# Patient Record
Sex: FEMALE | Race: BLACK OR AFRICAN AMERICAN | Employment: FULL TIME | URBAN - METROPOLITAN AREA
[De-identification: names, ages, dates, MRNs, and addresses within clinical notes are randomized per-mention and may not be internally consistent; named-entity substitution may affect disease eponyms.]

---

## 2019-03-29 ENCOUNTER — HOSPITAL ENCOUNTER (OUTPATIENT)
Age: 24
Discharge: HOME | End: 2019-03-29
Attending: FAMILY MEDICINE
Payer: COMMERCIAL

## 2019-03-29 VITALS
BODY MASS INDEX: 28.35 KG/M2 | OXYGEN SATURATION: 100 % | TEMPERATURE: 98 F | DIASTOLIC BLOOD PRESSURE: 73 MMHG | RESPIRATION RATE: 20 BRPM | SYSTOLIC BLOOD PRESSURE: 137 MMHG | HEIGHT: 63 IN | HEART RATE: 85 BPM | WEIGHT: 160 LBS

## 2019-03-29 DIAGNOSIS — N76.0 ACUTE VAGINITIS: Primary | ICD-10-CM

## 2019-03-29 DIAGNOSIS — N34.2 URETHRITIS: ICD-10-CM

## 2019-03-29 PROCEDURE — 81025 URINE PREGNANCY TEST: CPT

## 2019-03-29 PROCEDURE — 87086 URINE CULTURE/COLONY COUNT: CPT | Performed by: FAMILY MEDICINE

## 2019-03-29 PROCEDURE — 99204 OFFICE O/P NEW MOD 45 MIN: CPT

## 2019-03-29 PROCEDURE — 87591 N.GONORRHOEAE DNA AMP PROB: CPT | Performed by: FAMILY MEDICINE

## 2019-03-29 PROCEDURE — 87205 SMEAR GRAM STAIN: CPT | Performed by: FAMILY MEDICINE

## 2019-03-29 PROCEDURE — 87808 TRICHOMONAS ASSAY W/OPTIC: CPT | Performed by: FAMILY MEDICINE

## 2019-03-29 PROCEDURE — 87106 FUNGI IDENTIFICATION YEAST: CPT | Performed by: FAMILY MEDICINE

## 2019-03-29 PROCEDURE — 87491 CHLMYD TRACH DNA AMP PROBE: CPT | Performed by: FAMILY MEDICINE

## 2019-03-29 PROCEDURE — 81003 URINALYSIS AUTO W/O SCOPE: CPT

## 2019-03-29 RX ORDER — FLUCONAZOLE 150 MG/1
150 TABLET ORAL ONCE
Qty: 1 TABLET | Refills: 0 | Status: SHIPPED | OUTPATIENT
Start: 2019-03-29 | End: 2019-03-29

## 2019-03-30 NOTE — ED INITIAL ASSESSMENT (HPI)
Patient states having vaginal discharge since yesterday, vaginal itchiness. Patient states she had unprotected sex with one partner on Wednesday. Patient states having some pain on urination, increased frequency and urgency.   Patient denies abdominal saul

## 2019-03-30 NOTE — ED PROVIDER NOTES
Pt seen in [facility]      Stated Complaint: Patient presents with:  Eval-G (genital)  Urinary Symptoms (urologic)      --------------   RN assessment:     Vag d/c, unprotected sex few d ago  --------------    CC: vaginal organization: Not on file        Attends meetings of clubs or organizations: Not on file        Relationship status: Not on file      Intimate partner violence:        Fear of current or ex partner: Not on file        Emotionally abused: Not on file curvature, ROM normal, no CVA tenderness   Abdomen:     Soft, non-tender, bowel sounds active all four quadrants,     no masses, no organomegaly   Extremities:   Extremities normal, atraumatic, no cyanosis or edema   Pulses:   2+ and symmetric all extremit

## 2019-04-09 ENCOUNTER — LAB ENCOUNTER (OUTPATIENT)
Dept: LAB | Facility: REFERENCE LAB | Age: 24
End: 2019-04-09
Attending: FAMILY MEDICINE
Payer: COMMERCIAL

## 2019-04-09 ENCOUNTER — OFFICE VISIT (OUTPATIENT)
Dept: FAMILY MEDICINE CLINIC | Facility: CLINIC | Age: 24
End: 2019-04-09
Payer: COMMERCIAL

## 2019-04-09 VITALS
BODY MASS INDEX: 28.35 KG/M2 | WEIGHT: 160 LBS | DIASTOLIC BLOOD PRESSURE: 60 MMHG | SYSTOLIC BLOOD PRESSURE: 110 MMHG | HEIGHT: 63 IN | TEMPERATURE: 99 F | HEART RATE: 76 BPM

## 2019-04-09 DIAGNOSIS — Z00.00 ROUTINE GENERAL MEDICAL EXAMINATION AT A HEALTH CARE FACILITY: ICD-10-CM

## 2019-04-09 DIAGNOSIS — Z83.49 FAMILY HISTORY OF THYROID DISEASE: ICD-10-CM

## 2019-04-09 DIAGNOSIS — Z83.3 FAMILY HISTORY OF DIABETES MELLITUS (DM): ICD-10-CM

## 2019-04-09 DIAGNOSIS — Z30.019 ENCOUNTER FOR INITIAL PRESCRIPTION OF CONTRACEPTIVES, UNSPECIFIED CONTRACEPTIVE: ICD-10-CM

## 2019-04-09 DIAGNOSIS — Z12.4 ENCOUNTER FOR PAPANICOLAOU SMEAR FOR CERVICAL CANCER SCREENING: Primary | ICD-10-CM

## 2019-04-09 PROCEDURE — 88175 CYTOPATH C/V AUTO FLUID REDO: CPT | Performed by: FAMILY MEDICINE

## 2019-04-09 PROCEDURE — 36415 COLL VENOUS BLD VENIPUNCTURE: CPT | Performed by: FAMILY MEDICINE

## 2019-04-09 PROCEDURE — 99385 PREV VISIT NEW AGE 18-39: CPT | Performed by: FAMILY MEDICINE

## 2019-04-09 PROCEDURE — 80050 GENERAL HEALTH PANEL: CPT | Performed by: FAMILY MEDICINE

## 2019-04-09 NOTE — PROGRESS NOTES
HPI: 22year old female presents for WWE (NP). VSS. Denies fevers/chills, C.P., palpitations, dizziness, SOB, cough, abd pain, N/V/D, fatigue. No recent illness. Nml urine without dysuria or hematuria. Nml BM's. Weight is stable.  LMP 04/01/19, regular mens normal.  Neck: Supple, symmetrical, trachea midline, nml thyroid: no enlargement/tenderness/nodules. Lungs: Clear to auscultation bilaterally. Chest wall: No tenderness or deformity.   Breasts: No breast masses, tenderness, asymmetry, nipple discharge or

## 2019-05-03 RX ORDER — ACETAMINOPHEN AND CODEINE PHOSPHATE 120; 12 MG/5ML; MG/5ML
0.35 SOLUTION ORAL DAILY
Qty: 84 TABLET | Refills: 0 | Status: SHIPPED | OUTPATIENT
Start: 2019-05-03 | End: 2019-05-31

## 2019-05-03 RX ORDER — NORETHINDRONE 0.35 MG/1
TABLET ORAL
Qty: 84 TABLET | Refills: 0 | OUTPATIENT
Start: 2019-05-03

## 2019-05-03 NOTE — TELEPHONE ENCOUNTER
A refill request was received for:  Requested Prescriptions     Pending Prescriptions Disp Refills   • Norethindrone (JOLIVETTE) 0.35 MG Oral Tab 84 tablet 3     Sig: Take 1 tablet (0.35 mg total) by mouth daily for 28 days.      Refused Pre

## 2019-09-25 ENCOUNTER — HOSPITAL ENCOUNTER (OUTPATIENT)
Age: 24
Discharge: HOME | End: 2019-09-25
Attending: EMERGENCY MEDICINE
Payer: COMMERCIAL

## 2019-09-25 VITALS
HEIGHT: 63 IN | RESPIRATION RATE: 18 BRPM | TEMPERATURE: 99 F | DIASTOLIC BLOOD PRESSURE: 69 MMHG | HEART RATE: 89 BPM | OXYGEN SATURATION: 100 % | SYSTOLIC BLOOD PRESSURE: 129 MMHG | WEIGHT: 164 LBS | BODY MASS INDEX: 29.06 KG/M2

## 2019-09-25 DIAGNOSIS — N89.8 VAGINAL DISCHARGE: ICD-10-CM

## 2019-09-25 DIAGNOSIS — N30.00 ACUTE CYSTITIS WITHOUT HEMATURIA: Primary | ICD-10-CM

## 2019-09-25 LAB
B-HCG UR QL: NEGATIVE
BILIRUB UR QL STRIP: NEGATIVE
COLOR UR: YELLOW
GLUCOSE UR STRIP-MCNC: NEGATIVE MG/DL
KETONES UR STRIP-MCNC: NEGATIVE MG/DL
NITRITE UR QL STRIP: NEGATIVE
PH UR STRIP: 7.5 [PH]
SP GR UR STRIP: 1.02
UROBILINOGEN UR STRIP-ACNC: <2 MG/DL

## 2019-09-25 PROCEDURE — 81025 URINE PREGNANCY TEST: CPT

## 2019-09-25 PROCEDURE — 99214 OFFICE O/P EST MOD 30 MIN: CPT

## 2019-09-25 PROCEDURE — 87106 FUNGI IDENTIFICATION YEAST: CPT | Performed by: EMERGENCY MEDICINE

## 2019-09-25 PROCEDURE — 87086 URINE CULTURE/COLONY COUNT: CPT | Performed by: EMERGENCY MEDICINE

## 2019-09-25 PROCEDURE — 81002 URINALYSIS NONAUTO W/O SCOPE: CPT

## 2019-09-25 PROCEDURE — 87205 SMEAR GRAM STAIN: CPT | Performed by: EMERGENCY MEDICINE

## 2019-09-25 PROCEDURE — 87591 N.GONORRHOEAE DNA AMP PROB: CPT | Performed by: EMERGENCY MEDICINE

## 2019-09-25 PROCEDURE — 87491 CHLMYD TRACH DNA AMP PROBE: CPT | Performed by: EMERGENCY MEDICINE

## 2019-09-25 PROCEDURE — 87808 TRICHOMONAS ASSAY W/OPTIC: CPT | Performed by: EMERGENCY MEDICINE

## 2019-09-25 RX ORDER — FLUCONAZOLE 150 MG/1
150 TABLET ORAL
Qty: 3 TABLET | Refills: 0 | Status: SHIPPED | OUTPATIENT
Start: 2019-09-25 | End: 2020-02-26 | Stop reason: ALTCHOICE

## 2019-09-25 RX ORDER — CEPHALEXIN 500 MG/1
500 CAPSULE ORAL 3 TIMES DAILY
Qty: 15 CAPSULE | Refills: 0 | Status: SHIPPED | OUTPATIENT
Start: 2019-09-25 | End: 2020-02-26 | Stop reason: ALTCHOICE

## 2019-09-25 NOTE — ED NOTES
Patient states she is here for a pelvic exam to be sure she does not have any STDs. She denies any real symptoms except for some itching.

## 2019-09-25 NOTE — ED PROVIDER NOTES
Patient Seen in: [address]      History   Patient presents with:  Eval-G (genital)    Stated Complaint: urinary problem    HPI    The patient is a 25-year-old female with no significant past medical history presents now nondistended  Pelvic:  Neurologic: Patient is awake, alert and oriented ×3.   The patient's motor strength is 5 out of 5 and symmetric in the upper and lower extremities bilaterally  Extremities: No focal swelling or tenderness  Skin: No pallor, no redness

## 2019-09-26 LAB
C TRACH DNA SPEC QL NAA+PROBE: NEGATIVE
N GONORRHOEA DNA SPEC QL NAA+PROBE: NEGATIVE

## 2019-09-27 LAB
GENITAL VAGINOSIS SCREEN: NEGATIVE
TRICHOMONAS SCREEN: NEGATIVE

## 2020-02-26 ENCOUNTER — OFFICE VISIT (OUTPATIENT)
Dept: FAMILY MEDICINE CLINIC | Facility: CLINIC | Age: 25
End: 2020-02-26
Payer: COMMERCIAL

## 2020-02-26 VITALS
OXYGEN SATURATION: 98 % | WEIGHT: 167 LBS | HEIGHT: 63.5 IN | RESPIRATION RATE: 18 BRPM | BODY MASS INDEX: 29.22 KG/M2 | DIASTOLIC BLOOD PRESSURE: 80 MMHG | SYSTOLIC BLOOD PRESSURE: 112 MMHG | HEART RATE: 92 BPM

## 2020-02-26 DIAGNOSIS — Z00.00 ROUTINE MEDICAL EXAM: ICD-10-CM

## 2020-02-26 DIAGNOSIS — R21 RASH AND NONSPECIFIC SKIN ERUPTION: ICD-10-CM

## 2020-02-26 DIAGNOSIS — Z11.3 SCREEN FOR STD (SEXUALLY TRANSMITTED DISEASE): Primary | ICD-10-CM

## 2020-02-26 PROCEDURE — 87491 CHLMYD TRACH DNA AMP PROBE: CPT | Performed by: FAMILY MEDICINE

## 2020-02-26 PROCEDURE — 99214 OFFICE O/P EST MOD 30 MIN: CPT | Performed by: FAMILY MEDICINE

## 2020-02-26 PROCEDURE — 90715 TDAP VACCINE 7 YRS/> IM: CPT | Performed by: FAMILY MEDICINE

## 2020-02-26 PROCEDURE — 87591 N.GONORRHOEAE DNA AMP PROB: CPT | Performed by: FAMILY MEDICINE

## 2020-02-26 PROCEDURE — 90471 IMMUNIZATION ADMIN: CPT | Performed by: FAMILY MEDICINE

## 2020-02-26 NOTE — PROGRESS NOTES
HPI:   Patient presents with:  STD: screening  Derm Problem      Traverse Nurse is a 22year old female.     She primarily presents for:  STD screening, due to recent relationship and risk of possible exposure  Patient without symptoms or complaints, no va oral lesions  HEART:  No chest pain or palpitations  LUNG:  No SOB, cough or wheeze  GI:  No abdominal pain.   No GI/rectal bleeding, No N/V/D/C  :  No dysuria/hematuria  MS:  No joint pain or swelling B  NEURO: No numbness, tingling or weakness  PSYCH: in good health, check labs as below, Tdap given today, follow-up pending results and for physical when due April 2020  - TETANUS, DIPHTHERIA TOXOIDS AND ACELLULAR PERTUSIS VACCINE (TDAP), >7 YEARS, IM USE  - CBC WITH DIFFERENTIAL WITH PLATELET;  Future  - C

## 2020-02-27 LAB
C TRACH DNA SPEC QL NAA+PROBE: NEGATIVE
N GONORRHOEA DNA SPEC QL NAA+PROBE: NEGATIVE

## 2020-10-28 ENCOUNTER — HOSPITAL ENCOUNTER (OUTPATIENT)
Age: 25
Discharge: HOME | End: 2020-10-28
Payer: COMMERCIAL

## 2020-10-28 VITALS
SYSTOLIC BLOOD PRESSURE: 129 MMHG | TEMPERATURE: 98 F | RESPIRATION RATE: 16 BRPM | DIASTOLIC BLOOD PRESSURE: 80 MMHG | OXYGEN SATURATION: 100 % | BODY MASS INDEX: 29 KG/M2 | WEIGHT: 165 LBS | HEART RATE: 83 BPM

## 2020-10-28 DIAGNOSIS — J02.9 VIRAL PHARYNGITIS: Primary | ICD-10-CM

## 2020-10-28 DIAGNOSIS — B34.9 VIRAL ILLNESS: ICD-10-CM

## 2020-10-28 DIAGNOSIS — M54.31 SCIATICA OF RIGHT SIDE: ICD-10-CM

## 2020-10-28 DIAGNOSIS — M25.559 HIP PAIN: ICD-10-CM

## 2020-10-28 PROCEDURE — 81002 URINALYSIS NONAUTO W/O SCOPE: CPT | Performed by: NURSE PRACTITIONER

## 2020-10-28 PROCEDURE — 81025 URINE PREGNANCY TEST: CPT | Performed by: NURSE PRACTITIONER

## 2020-10-28 PROCEDURE — 87880 STREP A ASSAY W/OPTIC: CPT | Performed by: NURSE PRACTITIONER

## 2020-10-28 PROCEDURE — 99203 OFFICE O/P NEW LOW 30 MIN: CPT | Performed by: NURSE PRACTITIONER

## 2020-10-28 RX ORDER — DEXAMETHASONE SODIUM PHOSPHATE 10 MG/ML
10 INJECTION, SOLUTION INTRAMUSCULAR; INTRAVENOUS ONCE
Status: COMPLETED | OUTPATIENT
Start: 2020-10-28 | End: 2020-10-28

## 2020-10-28 RX ORDER — PREDNISONE 20 MG/1
40 TABLET ORAL DAILY
Qty: 8 TABLET | Refills: 0 | Status: SHIPPED | OUTPATIENT
Start: 2020-10-31 | End: 2020-11-04

## 2020-10-29 ENCOUNTER — TELEPHONE (OUTPATIENT)
Dept: PEDIATRICS UNIT | Facility: HOSPITAL | Age: 25
End: 2020-10-29

## 2020-10-29 ENCOUNTER — LAB ENCOUNTER (OUTPATIENT)
Dept: LAB | Age: 25
End: 2020-10-29
Attending: PREVENTIVE MEDICINE

## 2020-10-29 ENCOUNTER — TELEPHONE (OUTPATIENT)
Dept: INTERNAL MEDICINE CLINIC | Facility: HOSPITAL | Age: 25
End: 2020-10-29

## 2020-10-29 DIAGNOSIS — Z20.822 SUSPECTED COVID-19 VIRUS INFECTION: Primary | ICD-10-CM

## 2020-10-29 DIAGNOSIS — Z20.822 SUSPECTED COVID-19 VIRUS INFECTION: ICD-10-CM

## 2020-10-29 NOTE — ED INITIAL ASSESSMENT (HPI)
Pt presents with c/o having headaches last week. Had bilateral hip pain that radiates down her leg affecting her sleep.  C/o swelling to left side of neck

## 2020-10-29 NOTE — ED PROVIDER NOTES
Patient Seen in: [facility]      History   Patient presents with:  Neck Pain  Sore Throat  Leg or Foot Injury    Stated Complaint: testing    HPI    This is a 69-year-old female presenting with multiple complaints.   Patient states that she h Nose normal.      Mouth/Throat:      Mouth: Mucous membranes are moist.      Comments: Pharyngeal erythema slight tonsillar swelling no exudates uvula midline  Eyes:      Conjunctiva/sclera: Conjunctivae normal.   Neck:      Musculoskeletal: Normal range o slight tonsillar swelling and enlarged cervical lymph nodes on the left side. Likely viral in nature. Patient is a [employer]. Patient will be checked for strep urine dip and pregnancy will be collected.   Patient will be advised to follow-

## 2020-11-11 ENCOUNTER — PATIENT MESSAGE (OUTPATIENT)
Dept: OBGYN CLINIC | Facility: CLINIC | Age: 25
End: 2020-11-11

## 2020-11-11 NOTE — TELEPHONE ENCOUNTER
From: Chico Claros  To: Alex Power. MD Kailee  Sent: 11/11/2020 9:45 AM CST  Subject: Non-Urgent Medical Question    Would I still be able to come in for my yearly checkup. My period started yesterday usually last 4-5 days. Or do I need to reschedule?  I've

## 2020-11-13 ENCOUNTER — OFFICE VISIT (OUTPATIENT)
Dept: OBGYN CLINIC | Facility: CLINIC | Age: 25
End: 2020-11-13
Payer: COMMERCIAL

## 2020-11-13 VITALS
DIASTOLIC BLOOD PRESSURE: 83 MMHG | WEIGHT: 161.63 LBS | BODY MASS INDEX: 28.64 KG/M2 | HEART RATE: 88 BPM | SYSTOLIC BLOOD PRESSURE: 124 MMHG | HEIGHT: 63 IN

## 2020-11-13 DIAGNOSIS — Z01.419 ENCOUNTER FOR GYNECOLOGICAL EXAMINATION WITHOUT ABNORMAL FINDING: Primary | ICD-10-CM

## 2020-11-13 DIAGNOSIS — Z11.3 SCREEN FOR STD (SEXUALLY TRANSMITTED DISEASE): ICD-10-CM

## 2020-11-13 DIAGNOSIS — Z12.4 SCREENING FOR MALIGNANT NEOPLASM OF CERVIX: ICD-10-CM

## 2020-11-13 PROCEDURE — 99395 PREV VISIT EST AGE 18-39: CPT | Performed by: OBSTETRICS & GYNECOLOGY

## 2020-11-13 PROCEDURE — 3008F BODY MASS INDEX DOCD: CPT | Performed by: OBSTETRICS & GYNECOLOGY

## 2020-11-13 PROCEDURE — 3079F DIAST BP 80-89 MM HG: CPT | Performed by: OBSTETRICS & GYNECOLOGY

## 2020-11-13 PROCEDURE — 99072 ADDL SUPL MATRL&STAF TM PHE: CPT | Performed by: OBSTETRICS & GYNECOLOGY

## 2020-11-13 PROCEDURE — 3074F SYST BP LT 130 MM HG: CPT | Performed by: OBSTETRICS & GYNECOLOGY

## 2020-11-13 NOTE — PROGRESS NOTES
Lupillo Mello is a 26year old female  Patient's last menstrual period was 2020. Patient presents with:  Gyn Exam: NEW PATIENT, ANNUAL EXAM  .     Her cycles are regular. She has no complaints. She requests std testing.     OBSTETRICS HISTOR file        Emotionally abused: Not on file        Physically abused: Not on file        Forced sexual activity: Not on file    Other Topics      Concerns:        Not on file    Social History Narrative      Not on file      FAMILY HISTORY:  Family History cyanosis  Psychiatric:  Oriented to time, place, person and situation.  Appropriate mood and affect    Pelvic Exam:  External Genitalia: normal appearance, hair distribution, and no lesions  Urethral Meatus:  normal in size, location, without lesions and pr

## 2020-12-30 ENCOUNTER — NURSE ONLY (OUTPATIENT)
Dept: LAB | Facility: HOSPITAL | Age: 25
End: 2020-12-30
Attending: PREVENTIVE MEDICINE

## 2020-12-30 ENCOUNTER — TELEPHONE (OUTPATIENT)
Dept: INTERNAL MEDICINE CLINIC | Facility: HOSPITAL | Age: 25
End: 2020-12-30

## 2020-12-30 DIAGNOSIS — Z20.822 SUSPECTED 2019 NOVEL CORONAVIRUS INFECTION: ICD-10-CM

## 2020-12-30 DIAGNOSIS — Z20.822 SUSPECTED 2019 NOVEL CORONAVIRUS INFECTION: Primary | ICD-10-CM

## 2020-12-30 LAB — SARS-COV-2 AG RESP QL IA.RAPID: NOT DETECTED

## 2020-12-30 PROCEDURE — 87426 SARSCOV CORONAVIRUS AG IA: CPT

## 2020-12-30 NOTE — TELEPHONE ENCOUNTER
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 Yes [x]   No []       If yes, with whom? Computer area  Do you have any family members sick at home? [] Yes    [x] No   If yes, explain:       NOTES: Eligio called with the above s/s, she states she is currently at work.  Zakiya

## 2020-12-31 NOTE — TELEPHONE ENCOUNTER
Results and RTW guidelines:    COVID RESULT GIVEN:      Test type:    [x] Rapid         []       [x] NEGATIVE     Ordered  retest?  [x]Yes   [] No (skip to RTW)       Date ordered:  12/31/2020               Dated to be taken:  1/2/2021    If

## 2021-01-02 ENCOUNTER — LAB ENCOUNTER (OUTPATIENT)
Dept: LAB | Facility: HOSPITAL | Age: 26
End: 2021-01-02
Attending: PREVENTIVE MEDICINE

## 2021-01-02 DIAGNOSIS — Z20.822 SUSPECTED 2019 NOVEL CORONAVIRUS INFECTION: ICD-10-CM

## 2021-01-03 LAB — SARS-COV-2 RNA RESP QL NAA+PROBE: NOT DETECTED

## 2021-01-04 NOTE — TELEPHONE ENCOUNTER
Results and RTW guidelines:    COVID RESULT GIVEN:      Test type:    [] Rapid         [x]       [x] NEGATIVE     Ordered  retest?  []Yes   [x] No (skip to RTW)        [] Positive   - Monitor sx and temperature    - Employee should quarantine

## 2021-03-04 ENCOUNTER — HOSPITAL ENCOUNTER (OUTPATIENT)
Age: 26
Discharge: HOME | End: 2021-03-04
Payer: COMMERCIAL

## 2021-03-04 VITALS
SYSTOLIC BLOOD PRESSURE: 133 MMHG | OXYGEN SATURATION: 100 % | RESPIRATION RATE: 16 BRPM | HEART RATE: 89 BPM | DIASTOLIC BLOOD PRESSURE: 86 MMHG | TEMPERATURE: 98 F

## 2021-03-04 DIAGNOSIS — Z20.822 ENCOUNTER FOR SCREENING LABORATORY TESTING FOR COVID-19 VIRUS: Primary | ICD-10-CM

## 2021-03-04 LAB — SARS-COV-2 RNA RESP QL NAA+PROBE: NOT DETECTED

## 2021-03-04 PROCEDURE — 99212 OFFICE O/P EST SF 10 MIN: CPT | Performed by: NURSE PRACTITIONER

## 2021-03-04 PROCEDURE — U0002 COVID-19 LAB TEST NON-CDC: HCPCS | Performed by: NURSE PRACTITIONER

## 2021-03-04 NOTE — ED PROVIDER NOTES
Patient Seen in: [facility]    History   CC: covid testing  HPI: Slade Poser 23year old female  who presents requesting Covid testing prior to travel to [facility].  Is leaving tomorrow and is requesting a rapid PCR.   Denies complaints, sy voice is clear  Neck - no significant adenopathy, supple with trachea midline  Resp - Lung sounds clear bilaterally and wob unlabored, good aeration with equal, even expansion bilaterally   CV - RRR  Skin - no rashes or petechiae noted, pink warm and dry t

## 2021-03-04 NOTE — ED INITIAL ASSESSMENT (HPI)
Pt requesting covid test for travel to [facility] for flight tomorrow morning. Denies any symptoms. No known covid exposure. Awake/alert. Breathing easy and even without distress. Speech clear. Skin warm, dry and pink.

## 2021-05-07 ENCOUNTER — TELEPHONE (OUTPATIENT)
Dept: INTERNAL MEDICINE CLINIC | Facility: HOSPITAL | Age: 26
End: 2021-05-07

## 2021-05-07 ENCOUNTER — HOSPITAL ENCOUNTER (OUTPATIENT)
Age: 26
Discharge: HOME | End: 2021-05-07
Payer: COMMERCIAL

## 2021-05-07 VITALS
OXYGEN SATURATION: 99 % | SYSTOLIC BLOOD PRESSURE: 129 MMHG | HEART RATE: 101 BPM | RESPIRATION RATE: 16 BRPM | DIASTOLIC BLOOD PRESSURE: 75 MMHG | TEMPERATURE: 98 F

## 2021-05-07 DIAGNOSIS — U07.1 COVID-19: ICD-10-CM

## 2021-05-07 DIAGNOSIS — J06.9 UPPER RESPIRATORY TRACT INFECTION, UNSPECIFIED TYPE: Primary | ICD-10-CM

## 2021-05-07 PROCEDURE — U0002 COVID-19 LAB TEST NON-CDC: HCPCS | Performed by: NURSE PRACTITIONER

## 2021-05-07 PROCEDURE — 99213 OFFICE O/P EST LOW 20 MIN: CPT | Performed by: NURSE PRACTITIONER

## 2021-05-07 NOTE — ED PROVIDER NOTES
Patient Seen in: [facility]      History   Patient presents with:  Headache  Fever  Cough/URI    Stated Complaint: headache    HPI/Subjective:   HPI    61-year-old female with no significant history here today with complaints of congestion, without wheezes, rhonchi, crackles. No accessory muscle use or tachypnea. Abdomen: Soft, nontender, nondistended. Active bowel sounds present. Back: No CVA tenderness. Extremities: No edema. Pulses 2+ extremities.    Brisk capillary refil intake. Presentation consistent with Covid/URI. Encouraged patient on oral hydration and supportive care. Recommend follow up with PCP. Return to ED precautions discussed with the patient and family.       I explained the importance of letting her employer

## 2021-05-07 NOTE — TELEPHONE ENCOUNTER
.    Results and RTW guidelines:    COVID RESULT GIVEN:      Test type:    [x] Rapid         [] Alinity  [x] Positive   - Monitor sx and temperature    - Employee should quarantine at home for at least 10 days from sx onset, follow the CDC guidelines for c re-evaluation including when to seek emergency care    Estimated RTW date:  05.15.2021    [x] The employee voiced understanding of above plan/instructions  [x] Manager Notified/Labor  Notified, if pertinent

## 2021-06-12 ENCOUNTER — LAB ENCOUNTER (OUTPATIENT)
Dept: LAB | Facility: HOSPITAL | Age: 26
End: 2021-06-12
Attending: OBSTETRICS & GYNECOLOGY
Payer: COMMERCIAL

## 2021-06-12 DIAGNOSIS — Z11.3 SCREEN FOR STD (SEXUALLY TRANSMITTED DISEASE): ICD-10-CM

## 2021-06-12 PROCEDURE — 87389 HIV-1 AG W/HIV-1&-2 AB AG IA: CPT

## 2021-06-12 PROCEDURE — 86780 TREPONEMA PALLIDUM: CPT

## 2021-06-12 PROCEDURE — 36415 COLL VENOUS BLD VENIPUNCTURE: CPT

## 2021-06-12 PROCEDURE — 87340 HEPATITIS B SURFACE AG IA: CPT

## 2021-06-23 ENCOUNTER — APPOINTMENT (OUTPATIENT)
Dept: OTHER | Facility: HOSPITAL | Age: 26
End: 2021-06-23
Attending: PREVENTIVE MEDICINE

## 2021-06-25 ENCOUNTER — APPOINTMENT (OUTPATIENT)
Dept: OTHER | Facility: HOSPITAL | Age: 26
End: 2021-06-25
Attending: PREVENTIVE MEDICINE

## 2021-07-07 ENCOUNTER — OFFICE VISIT (OUTPATIENT)
Dept: FAMILY MEDICINE CLINIC | Facility: CLINIC | Age: 26
End: 2021-07-07
Payer: COMMERCIAL

## 2021-07-07 VITALS
BODY MASS INDEX: 30.65 KG/M2 | DIASTOLIC BLOOD PRESSURE: 78 MMHG | WEIGHT: 173 LBS | HEIGHT: 63 IN | HEART RATE: 82 BPM | SYSTOLIC BLOOD PRESSURE: 110 MMHG

## 2021-07-07 DIAGNOSIS — Z00.00 ROUTINE MEDICAL EXAM: Primary | ICD-10-CM

## 2021-07-07 DIAGNOSIS — N92.6 IRREGULAR MENSES: ICD-10-CM

## 2021-07-07 DIAGNOSIS — Z11.3 SCREEN FOR STD (SEXUALLY TRANSMITTED DISEASE): ICD-10-CM

## 2021-07-07 LAB
CONTROL LINE PRESENT WITH A CLEAR BACKGROUND (YES/NO): YES YES/NO
KIT LOT #: NORMAL NUMERIC
PREGNANCY TEST, URINE: NEGATIVE

## 2021-07-07 PROCEDURE — 3078F DIAST BP <80 MM HG: CPT | Performed by: FAMILY MEDICINE

## 2021-07-07 PROCEDURE — 3008F BODY MASS INDEX DOCD: CPT | Performed by: FAMILY MEDICINE

## 2021-07-07 PROCEDURE — 3074F SYST BP LT 130 MM HG: CPT | Performed by: FAMILY MEDICINE

## 2021-07-07 PROCEDURE — 87491 CHLMYD TRACH DNA AMP PROBE: CPT | Performed by: FAMILY MEDICINE

## 2021-07-07 PROCEDURE — 99395 PREV VISIT EST AGE 18-39: CPT | Performed by: FAMILY MEDICINE

## 2021-07-07 PROCEDURE — 81025 URINE PREGNANCY TEST: CPT | Performed by: FAMILY MEDICINE

## 2021-07-07 PROCEDURE — 87591 N.GONORRHOEAE DNA AMP PROB: CPT | Performed by: FAMILY MEDICINE

## 2021-07-08 LAB
C TRACH DNA SPEC QL NAA+PROBE: NEGATIVE
N GONORRHOEA DNA SPEC QL NAA+PROBE: NEGATIVE

## 2021-07-08 NOTE — H&P
HPI:   Patient presents with:  Physical  STD: wanting to get check      Cassandrasathya Benjamin is a 23year old female who presents for a complete physical exam without pap/gyne exam.     Patient with complaints of:  · Irreg Menstrual bleeding and cycle    See RO Sexual activity: Not Currently        Partners: Male        Comment: abstinent x 1 year        REVIEW OF SYSTEMS:   Pertinent positives and negatives noted in the the HPI.  Specifically:  GEN:  No fever or excessive fatigue  HEAD:  No frequent headaches, no PLAN:   1. Emily Ervin is a 23year old female who presents for a complete physical exam.  She is in good health.   Her weight is stable, Estimated body mass index is 30.65 kg/m² as calculated from the following:    Height as of this encounter: 5' 3\" (1. above    Anuel Rosales MD

## 2021-07-12 NOTE — PROGRESS NOTES
Scott Martinez,    Attached are the results of your recently performed labs/tests: You tested negative for both Chlamydia and Gonorrhea. Your urine pregnancy test was negative as you are aware.     Take care,     Eagle Salas MD  7/11/202

## 2021-08-06 ENCOUNTER — TELEPHONE (OUTPATIENT)
Dept: INTERNAL MEDICINE CLINIC | Facility: HOSPITAL | Age: 26
End: 2021-08-06

## 2021-08-06 DIAGNOSIS — Z20.822 CLOSE EXPOSURE TO COVID-19 VIRUS: Primary | ICD-10-CM

## 2021-08-06 NOTE — TELEPHONE ENCOUNTER
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 8/9/2021    Did you have close contact with someone on your unit while not wearing a mask? (e.g., during meal breaks):  Yes []   No [x]    If yes, who:   Do you share a workspace? Yes []   No [x]       If yes, with whom?    Do you ha notified    INSTRUCTIONS PROVIDED:    [x]Employee was instructed to call Central scheduling at [phone] or use [employer] to make an appointment for their testing and remain in their vehicle when arrived at site until they are contacted for testing    [x]

## 2021-08-07 ENCOUNTER — LAB ENCOUNTER (OUTPATIENT)
Dept: LAB | Age: 26
End: 2021-08-07
Attending: PREVENTIVE MEDICINE

## 2021-08-07 DIAGNOSIS — Z20.822 CLOSE EXPOSURE TO COVID-19 VIRUS: ICD-10-CM

## 2021-08-07 LAB — SARS-COV-2 RNA RESP QL NAA+PROBE: NOT DETECTED

## 2021-08-08 NOTE — TELEPHONE ENCOUNTER
Results and RTW guidelines:    COVID RESULT viewed by employee in [address]; [employer] message sent:      Test type:    [x] Rapid         [x] Alinity      [x] NEGATIVE     Ordered Alinity retest?  [x]Yes   [] No (skip to RTW)       Date ordered:  8/8/21

## 2021-08-11 ENCOUNTER — LAB ENCOUNTER (OUTPATIENT)
Dept: LAB | Facility: HOSPITAL | Age: 26
End: 2021-08-11
Attending: PREVENTIVE MEDICINE
Payer: COMMERCIAL

## 2021-08-11 DIAGNOSIS — Z20.822 CLOSE EXPOSURE TO COVID-19 VIRUS: ICD-10-CM

## 2021-08-12 LAB — SARS-COV-2 RNA RESP QL NAA+PROBE: NOT DETECTED

## 2021-08-12 NOTE — TELEPHONE ENCOUNTER
Results and RTW guidelines:    COVID RESULT GIVEN:      Test type:    [] Rapid         [x] Alinity      [x] NEGATIVE      Notes:     RTW PLAN:    [] RTW 10 days with clearance from [facility]- call for appt 2 days prior to RTW date  [] RTW immediately, continue

## 2021-09-01 ENCOUNTER — IMMUNIZATION (OUTPATIENT)
Dept: LAB | Facility: HOSPITAL | Age: 26
End: 2021-09-01
Attending: EMERGENCY MEDICINE
Payer: COMMERCIAL

## 2021-09-01 DIAGNOSIS — Z23 NEED FOR VACCINATION: Primary | ICD-10-CM

## 2021-09-01 PROCEDURE — 0001A SARSCOV2 VAC 30MCG/0.3ML IM: CPT

## 2021-09-22 ENCOUNTER — TELEPHONE (OUTPATIENT)
Dept: INTERNAL MEDICINE CLINIC | Facility: HOSPITAL | Age: 26
End: 2021-09-22

## 2021-09-22 ENCOUNTER — LAB ENCOUNTER (OUTPATIENT)
Dept: LAB | Age: 26
End: 2021-09-22
Attending: PREVENTIVE MEDICINE

## 2021-09-22 DIAGNOSIS — Z20.822 SUSPECTED COVID-19 VIRUS INFECTION: ICD-10-CM

## 2021-09-22 DIAGNOSIS — Z20.822 SUSPECTED COVID-19 VIRUS INFECTION: Primary | ICD-10-CM

## 2021-09-22 DIAGNOSIS — Z20.822 EXPOSURE TO COVID-19 VIRUS: ICD-10-CM

## 2021-09-22 LAB — SARS-COV-2 RNA RESP QL NAA+PROBE: NOT DETECTED

## 2021-09-22 NOTE — TELEPHONE ENCOUNTER
Department: Out pt rehab-[address]            [x] [facility]  []COLLEEN   [] [address] 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 9/21/21  When are you next scheduled to work? 9/22/21-Leaving work now    Did you have close contact with someone on your unit while not wearing a mask? (e.g., during meal breaks):  Yes []   No [x]    If yes, who:   Do you share a workspace?  [place] exposure.                                                  COVID-19 testing ordered: [x] Rapid    [] Alinity    Date test is to be taken:    9/22/21   If Negative,  Will order Alinity in 3-5 days  []  Outside testing       [x] Manager notified    INSTRUCTIO

## 2021-09-23 NOTE — TELEPHONE ENCOUNTER
Results and RTW guidelines:    COVID RESULT:    [x] Viewed by employee in [address] [] Discussed with employee   [x] Unable to reach by phone - LVM  X 2.   Sent via [employer] message      Test type:    [x] Rapid         [] Alinity         [] Outside test: Diarrhea/Vomiting free for 24 hours w/o medications  [x] Alinity ordered; continue to monitor sx and call for new sx.     [x] May continue to work if asymptomatic  [] If test result is negative, return to work after 7 days from exposure date  [] Follow up w

## 2021-09-23 NOTE — TELEPHONE ENCOUNTER
Discussed Rapid negative results and to call Central Scheduling for next order of Alinity to be taken on 9/24/2021. Pt remains asymptomatic. Pt received [employer] message and VM.   Pt will view [employer] for results and only will expect contact if results are

## 2021-09-24 ENCOUNTER — LAB ENCOUNTER (OUTPATIENT)
Dept: LAB | Age: 26
End: 2021-09-24
Attending: PREVENTIVE MEDICINE

## 2021-09-24 DIAGNOSIS — Z20.822 EXPOSURE TO COVID-19 VIRUS: ICD-10-CM

## 2021-09-26 LAB — SARS-COV-2 RNA RESP QL NAA+PROBE: NOT DETECTED

## 2021-09-26 NOTE — TELEPHONE ENCOUNTER
Results and RTW guidelines:    COVID RESULT:    [x] Viewed by employee in [address] RTW plan and instructions as indicated on triage call. Manager notified. Estimated RTW date: 9/24/2021  [] Discussed with employee   [] Unable to reach by phone.   Sent monitor sx and call for new/worsening sx.   Discuss RTW guidelines with manager  [] May continue to work  [] If test result is negative, return to work after 7 days from exposure date  [] Follow up with PCP  [] Home until further instruction from hotline wi

## 2021-10-07 ENCOUNTER — IMMUNIZATION (OUTPATIENT)
Dept: LAB | Facility: HOSPITAL | Age: 26
End: 2021-10-07
Attending: EMERGENCY MEDICINE
Payer: COMMERCIAL

## 2021-10-07 DIAGNOSIS — Z23 NEED FOR VACCINATION: Primary | ICD-10-CM

## 2021-10-07 PROCEDURE — 0002A SARSCOV2 VAC 30MCG/0.3ML IM: CPT

## 2021-12-29 ENCOUNTER — HOSPITAL ENCOUNTER (EMERGENCY)
Facility: HOSPITAL | Age: 26
Discharge: HOME | End: 2021-12-29
Payer: COMMERCIAL

## 2021-12-29 ENCOUNTER — APPOINTMENT (OUTPATIENT)
Dept: GENERAL RADIOLOGY | Facility: HOSPITAL | Age: 26
End: 2021-12-29
Payer: COMMERCIAL

## 2021-12-29 VITALS
OXYGEN SATURATION: 99 % | RESPIRATION RATE: 18 BRPM | TEMPERATURE: 99 F | WEIGHT: 160 LBS | DIASTOLIC BLOOD PRESSURE: 78 MMHG | HEIGHT: 63 IN | SYSTOLIC BLOOD PRESSURE: 125 MMHG | HEART RATE: 65 BPM | BODY MASS INDEX: 28.35 KG/M2

## 2021-12-29 DIAGNOSIS — R07.9 ACUTE CHEST PAIN: Primary | ICD-10-CM

## 2021-12-29 DIAGNOSIS — R06.00 DYSPNEA, UNSPECIFIED TYPE: ICD-10-CM

## 2021-12-29 LAB
ANION GAP SERPL CALC-SCNC: 7 MMOL/L (ref 0–18)
BASOPHILS # BLD AUTO: 0.05 X10(3) UL (ref 0–0.2)
BASOPHILS NFR BLD AUTO: 0.6 %
BUN BLD-MCNC: 14 MG/DL (ref 7–18)
BUN/CREAT SERPL: 18.4 (ref 10–20)
CALCIUM BLD-MCNC: 8.6 MG/DL (ref 8.5–10.1)
CHLORIDE SERPL-SCNC: 109 MMOL/L (ref 98–112)
CO2 SERPL-SCNC: 25 MMOL/L (ref 21–32)
CREAT BLD-MCNC: 0.76 MG/DL
D DIMER PPP FEU-MCNC: 0.53 UG/ML FEU (ref ?–0.5)
DEPRECATED RDW RBC AUTO: 41.7 FL (ref 35.1–46.3)
EOSINOPHIL # BLD AUTO: 0.09 X10(3) UL (ref 0–0.7)
EOSINOPHIL NFR BLD AUTO: 1.1 %
ERYTHROCYTE [DISTWIDTH] IN BLOOD BY AUTOMATED COUNT: 12.6 % (ref 11–15)
GLUCOSE BLD-MCNC: 88 MG/DL (ref 70–99)
HCT VFR BLD AUTO: 35.3 %
HGB BLD-MCNC: 11.6 G/DL
IMM GRANULOCYTES # BLD AUTO: 0.02 X10(3) UL (ref 0–1)
IMM GRANULOCYTES NFR BLD: 0.3 %
LYMPHOCYTES # BLD AUTO: 2.77 X10(3) UL (ref 1–4)
LYMPHOCYTES NFR BLD AUTO: 35.2 %
MCH RBC QN AUTO: 29.7 PG (ref 26–34)
MCHC RBC AUTO-ENTMCNC: 32.9 G/DL (ref 31–37)
MCV RBC AUTO: 90.5 FL
MONOCYTES # BLD AUTO: 0.77 X10(3) UL (ref 0.1–1)
MONOCYTES NFR BLD AUTO: 9.8 %
NEUTROPHILS # BLD AUTO: 4.18 X10 (3) UL (ref 1.5–7.7)
NEUTROPHILS # BLD AUTO: 4.18 X10(3) UL (ref 1.5–7.7)
NEUTROPHILS NFR BLD AUTO: 53 %
OSMOLALITY SERPL CALC.SUM OF ELEC: 292 MOSM/KG (ref 275–295)
PLATELET # BLD AUTO: 243 10(3)UL (ref 150–450)
POTASSIUM SERPL-SCNC: 3.9 MMOL/L (ref 3.5–5.1)
RBC # BLD AUTO: 3.9 X10(6)UL
SODIUM SERPL-SCNC: 141 MMOL/L (ref 136–145)
TROPONIN I HIGH SENSITIVITY: 3 NG/L
WBC # BLD AUTO: 7.9 X10(3) UL (ref 4–11)

## 2021-12-29 PROCEDURE — 84484 ASSAY OF TROPONIN QUANT: CPT | Performed by: NURSE PRACTITIONER

## 2021-12-29 PROCEDURE — 99284 EMERGENCY DEPT VISIT MOD MDM: CPT

## 2021-12-29 PROCEDURE — 71045 X-RAY EXAM CHEST 1 VIEW: CPT

## 2021-12-29 PROCEDURE — 36415 COLL VENOUS BLD VENIPUNCTURE: CPT

## 2021-12-29 PROCEDURE — 93005 ELECTROCARDIOGRAM TRACING: CPT

## 2021-12-29 PROCEDURE — 85379 FIBRIN DEGRADATION QUANT: CPT | Performed by: NURSE PRACTITIONER

## 2021-12-29 PROCEDURE — 93010 ELECTROCARDIOGRAM REPORT: CPT | Performed by: INTERNAL MEDICINE

## 2021-12-29 PROCEDURE — 80048 BASIC METABOLIC PNL TOTAL CA: CPT

## 2021-12-29 PROCEDURE — 85025 COMPLETE CBC W/AUTO DIFF WBC: CPT

## 2021-12-29 RX ORDER — ALBUTEROL SULFATE 90 UG/1
2 AEROSOL, METERED RESPIRATORY (INHALATION) EVERY 4 HOURS PRN
Qty: 1 EACH | Refills: 0 | Status: SHIPPED | OUTPATIENT
Start: 2021-12-29 | End: 2021-12-29

## 2021-12-29 RX ORDER — ALBUTEROL SULFATE 90 UG/1
2 AEROSOL, METERED RESPIRATORY (INHALATION) EVERY 4 HOURS PRN
Qty: 1 EACH | Refills: 0 | Status: SHIPPED | OUTPATIENT
Start: 2021-12-29 | End: 2022-01-14

## 2021-12-30 NOTE — ED PROVIDER NOTES
Patient Seen in: [facility] Emergency Department      History   Patient presents with:  Chest Pain  Difficulty Breathing    Stated Complaint: Shortness of Breath/Chest Pain    Subjective:   25yo/f with no chronic medical problems reports to the ED heart sounds. Pulmonary:      Effort: Pulmonary effort is normal.      Breath sounds: Normal breath sounds. Abdominal:      General: Bowel sounds are normal.      Palpations: Abdomen is soft.    Musculoskeletal:         General: No tenderness or deformi abnormality or cardiomegaly.  Unremarkable pulmonary vasculature. MEDIAST/FRENCH:   No visible mass or adenopathy. LUNGS/PLEURA: Negative for focal consolidation, pleural effusion, or pneumothorax. BONES: No fracture or visible bony lesion.    OTHER: Ne

## 2021-12-30 NOTE — ED INITIAL ASSESSMENT (HPI)
Patient here with shortness of breath and chest pain. Patient states she was diagnosed with covid about 2 weeks ago. Patient states she was cleared to go back to work on Monday but continues to have shortness of breath with exertion.

## 2021-12-30 NOTE — ED QUICK NOTES
Pt states on 12/14/21 was diagnosed with COVID, for 2nd time. States continues to have shortness of breath and chest discomfort. States chest feels congested. States shortness of breath with exertion.  States was supposed to being returning to work, but adv

## 2022-01-11 ENCOUNTER — OFFICE VISIT (OUTPATIENT)
Dept: FAMILY MEDICINE CLINIC | Facility: CLINIC | Age: 27
End: 2022-01-11
Payer: COMMERCIAL

## 2022-01-11 ENCOUNTER — LAB ENCOUNTER (OUTPATIENT)
Dept: LAB | Facility: HOSPITAL | Age: 27
End: 2022-01-11
Attending: FAMILY MEDICINE
Payer: COMMERCIAL

## 2022-01-11 DIAGNOSIS — Z00.00 ROUTINE MEDICAL EXAM: ICD-10-CM

## 2022-01-11 DIAGNOSIS — Z11.3 SCREEN FOR STD (SEXUALLY TRANSMITTED DISEASE): ICD-10-CM

## 2022-01-11 DIAGNOSIS — J98.01 BRONCHOSPASM: ICD-10-CM

## 2022-01-11 DIAGNOSIS — N92.6 IRREGULAR MENSES: ICD-10-CM

## 2022-01-11 DIAGNOSIS — Z86.16 HISTORY OF 2019 NOVEL CORONAVIRUS DISEASE (COVID-19): ICD-10-CM

## 2022-01-11 DIAGNOSIS — R06.02 SOB (SHORTNESS OF BREATH): Primary | ICD-10-CM

## 2022-01-11 LAB
ALBUMIN SERPL-MCNC: 3.9 G/DL (ref 3.4–5)
ALBUMIN/GLOB SERPL: 1.1 {RATIO} (ref 1–2)
ALP LIVER SERPL-CCNC: 80 U/L
ALT SERPL-CCNC: 21 U/L
ANION GAP SERPL CALC-SCNC: 7 MMOL/L (ref 0–18)
AST SERPL-CCNC: 13 U/L (ref 15–37)
BASOPHILS # BLD AUTO: 0.03 X10(3) UL (ref 0–0.2)
BASOPHILS NFR BLD AUTO: 0.5 %
BILIRUB SERPL-MCNC: 0.2 MG/DL (ref 0.1–2)
BUN BLD-MCNC: 11 MG/DL (ref 7–18)
BUN/CREAT SERPL: 13.9 (ref 10–20)
CALCIUM BLD-MCNC: 9.2 MG/DL (ref 8.5–10.1)
CHLORIDE SERPL-SCNC: 105 MMOL/L (ref 98–112)
CHOLEST SERPL-MCNC: 161 MG/DL (ref ?–200)
CO2 SERPL-SCNC: 28 MMOL/L (ref 21–32)
CREAT BLD-MCNC: 0.79 MG/DL
DEPRECATED RDW RBC AUTO: 41.3 FL (ref 35.1–46.3)
EOSINOPHIL # BLD AUTO: 0.12 X10(3) UL (ref 0–0.7)
EOSINOPHIL NFR BLD AUTO: 1.9 %
ERYTHROCYTE [DISTWIDTH] IN BLOOD BY AUTOMATED COUNT: 12.6 % (ref 11–15)
FASTING PATIENT LIPID ANSWER: NO
FASTING STATUS PATIENT QL REPORTED: NO
GLOBULIN PLAS-MCNC: 3.6 G/DL (ref 2.8–4.4)
GLUCOSE BLD-MCNC: 126 MG/DL (ref 70–99)
HCT VFR BLD AUTO: 38.6 %
HDLC SERPL-MCNC: 61 MG/DL (ref 40–59)
HGB BLD-MCNC: 12.4 G/DL
IMM GRANULOCYTES # BLD AUTO: 0.02 X10(3) UL (ref 0–1)
IMM GRANULOCYTES NFR BLD: 0.3 %
LDLC SERPL CALC-MCNC: 70 MG/DL (ref ?–100)
LYMPHOCYTES # BLD AUTO: 2.36 X10(3) UL (ref 1–4)
LYMPHOCYTES NFR BLD AUTO: 36.5 %
MCH RBC QN AUTO: 28.8 PG (ref 26–34)
MCHC RBC AUTO-ENTMCNC: 32.1 G/DL (ref 31–37)
MCV RBC AUTO: 89.8 FL
MONOCYTES # BLD AUTO: 0.51 X10(3) UL (ref 0.1–1)
MONOCYTES NFR BLD AUTO: 7.9 %
NEUTROPHILS # BLD AUTO: 3.43 X10 (3) UL (ref 1.5–7.7)
NEUTROPHILS # BLD AUTO: 3.43 X10(3) UL (ref 1.5–7.7)
NEUTROPHILS NFR BLD AUTO: 52.9 %
NONHDLC SERPL-MCNC: 100 MG/DL (ref ?–130)
OSMOLALITY SERPL CALC.SUM OF ELEC: 291 MOSM/KG (ref 275–295)
PLATELET # BLD AUTO: 313 10(3)UL (ref 150–450)
POTASSIUM SERPL-SCNC: 4.3 MMOL/L (ref 3.5–5.1)
PROT SERPL-MCNC: 7.5 G/DL (ref 6.4–8.2)
RBC # BLD AUTO: 4.3 X10(6)UL
SODIUM SERPL-SCNC: 140 MMOL/L (ref 136–145)
TRIGL SERPL-MCNC: 183 MG/DL (ref 30–149)
TSI SER-ACNC: 1.13 MIU/ML (ref 0.36–3.74)
VLDLC SERPL CALC-MCNC: 28 MG/DL (ref 0–30)
WBC # BLD AUTO: 6.5 X10(3) UL (ref 4–11)

## 2022-01-11 PROCEDURE — 84443 ASSAY THYROID STIM HORMONE: CPT

## 2022-01-11 PROCEDURE — 36415 COLL VENOUS BLD VENIPUNCTURE: CPT

## 2022-01-11 PROCEDURE — 80061 LIPID PANEL: CPT

## 2022-01-11 PROCEDURE — 99214 OFFICE O/P EST MOD 30 MIN: CPT | Performed by: FAMILY MEDICINE

## 2022-01-11 PROCEDURE — 80053 COMPREHEN METABOLIC PANEL: CPT

## 2022-01-11 PROCEDURE — 87389 HIV-1 AG W/HIV-1&-2 AB AG IA: CPT

## 2022-01-11 PROCEDURE — 87491 CHLMYD TRACH DNA AMP PROBE: CPT | Performed by: FAMILY MEDICINE

## 2022-01-11 PROCEDURE — 86780 TREPONEMA PALLIDUM: CPT

## 2022-01-11 PROCEDURE — 85025 COMPLETE CBC W/AUTO DIFF WBC: CPT

## 2022-01-11 PROCEDURE — 87591 N.GONORRHOEAE DNA AMP PROB: CPT | Performed by: FAMILY MEDICINE

## 2022-01-12 LAB
C TRACH DNA SPEC QL NAA+PROBE: NEGATIVE
N GONORRHOEA DNA SPEC QL NAA+PROBE: NEGATIVE
T PALLIDUM AB SER QL: NEGATIVE

## 2022-01-12 NOTE — PROGRESS NOTES
Due to the real risk of possible exposure to Coronavirus (CoV-2, COVID-19) in the office/medical building and recommendations for social distancing (key to mitigation/limiting the spread of the virus) a Virtual or Telemedicine visit over the phone was perf calls for: ***    Additional Assessment and Plan:  1.  SOB (shortness of breath)  ***    2. Bronchospasm  ***    3. History of 2019 novel coronavirus disease (COVID-19)  ***    4. Screen for STD (sexually transmitted disease)  ***  - CHLAMYDIA/GONOCOCCUS, N

## 2022-01-12 NOTE — PROGRESS NOTES
HPI:  Patient presents with:  Covid  Shortness Of Breath  Eval-G: screen STD      Sarabjit Reeder is a 23year old female who presents for:    Follow up  Upper Respiratory Illness, likely COVID  Pt overall improving, mild  Intermittent SOB and cough still, n developed, well nourished, in no acute distress  SKIN: no rashes, no suspicious lesions  EYES: Conjunctiva are clear, no discharge  HEENT: normocephalic, atraumatic, TMs bulging bilaterally-no erythema B, oropharynx wnl  NECK: supple, no B cervical lymphad plan.    Orders Placed This Encounter      HIV Ag/Ab Combo      T Pallidum Screening Cascade      Chlamydia/Gc Amplification      Meds & Refills for this Visit:  Requested Prescriptions      No prescriptions requested or ordered in this encounter       Raj Sharma

## 2022-01-13 NOTE — PROGRESS NOTES
Hi [facility],    Attached are the results of your recently performed labs/tests: All results are essentially within NORMAL limits for a non-fasting test.    Your HIV and syphilis tests were negative.     Take care,     Zion Vega MD  1/1

## 2022-01-13 NOTE — PROGRESS NOTES
Scott Saeed,    Attached are the results of your recently performed labs/tests: All results are negative.     Take care,     Dorothy Jamison MD  1/12/2022    (Report(s) are attached, future lab/test orders or any referrals are in your chart/

## 2022-01-14 ENCOUNTER — PATIENT MESSAGE (OUTPATIENT)
Dept: FAMILY MEDICINE CLINIC | Facility: CLINIC | Age: 27
End: 2022-01-14

## 2022-01-14 RX ORDER — ALBUTEROL SULFATE 90 UG/1
2 AEROSOL, METERED RESPIRATORY (INHALATION) EVERY 4 HOURS PRN
Qty: 1 EACH | Refills: 0 | Status: SHIPPED | OUTPATIENT
Start: 2022-01-14 | End: 2022-02-13

## 2022-01-14 NOTE — TELEPHONE ENCOUNTER
A refill request was received for:  Requested Prescriptions     Pending Prescriptions Disp Refills   • albuterol 108 (90 Base) MCG/ACT Inhalation Aero Soln 1 each 0     Sig: Inhale 2 puffs into the lungs every 4 (four) hours as needed for Wheezing.      Tony Raman

## 2022-03-17 ENCOUNTER — LAB ENCOUNTER (OUTPATIENT)
Dept: LAB | Facility: HOSPITAL | Age: 27
End: 2022-03-17
Attending: OBSTETRICS & GYNECOLOGY
Payer: COMMERCIAL

## 2022-03-17 ENCOUNTER — OFFICE VISIT (OUTPATIENT)
Dept: OBGYN CLINIC | Facility: CLINIC | Age: 27
End: 2022-03-17
Payer: COMMERCIAL

## 2022-03-17 VITALS
BODY MASS INDEX: 31.54 KG/M2 | DIASTOLIC BLOOD PRESSURE: 80 MMHG | WEIGHT: 178 LBS | SYSTOLIC BLOOD PRESSURE: 122 MMHG | HEIGHT: 63 IN

## 2022-03-17 DIAGNOSIS — Z20.2 STD EXPOSURE: ICD-10-CM

## 2022-03-17 DIAGNOSIS — Z20.2 STD EXPOSURE: Primary | ICD-10-CM

## 2022-03-17 DIAGNOSIS — Z30.09 FAMILY PLANNING COUNSELING: ICD-10-CM

## 2022-03-17 PROCEDURE — 87205 SMEAR GRAM STAIN: CPT | Performed by: OBSTETRICS & GYNECOLOGY

## 2022-03-17 PROCEDURE — 87106 FUNGI IDENTIFICATION YEAST: CPT | Performed by: OBSTETRICS & GYNECOLOGY

## 2022-03-17 PROCEDURE — 87591 N.GONORRHOEAE DNA AMP PROB: CPT | Performed by: OBSTETRICS & GYNECOLOGY

## 2022-03-17 PROCEDURE — 36415 COLL VENOUS BLD VENIPUNCTURE: CPT

## 2022-03-17 PROCEDURE — 87491 CHLMYD TRACH DNA AMP PROBE: CPT | Performed by: OBSTETRICS & GYNECOLOGY

## 2022-03-17 PROCEDURE — 86780 TREPONEMA PALLIDUM: CPT

## 2022-03-17 PROCEDURE — 99214 OFFICE O/P EST MOD 30 MIN: CPT | Performed by: OBSTETRICS & GYNECOLOGY

## 2022-03-17 PROCEDURE — 3008F BODY MASS INDEX DOCD: CPT | Performed by: OBSTETRICS & GYNECOLOGY

## 2022-03-17 PROCEDURE — 3074F SYST BP LT 130 MM HG: CPT | Performed by: OBSTETRICS & GYNECOLOGY

## 2022-03-17 PROCEDURE — 87389 HIV-1 AG W/HIV-1&-2 AB AG IA: CPT

## 2022-03-17 PROCEDURE — 87808 TRICHOMONAS ASSAY W/OPTIC: CPT | Performed by: OBSTETRICS & GYNECOLOGY

## 2022-03-17 PROCEDURE — 3079F DIAST BP 80-89 MM HG: CPT | Performed by: OBSTETRICS & GYNECOLOGY

## 2022-03-17 RX ORDER — NORETHINDRONE ACETATE AND ETHINYL ESTRADIOL, ETHINYL ESTRADIOL AND FERROUS FUMARATE 1MG-10(24)
1 KIT ORAL DAILY
Qty: 84 TABLET | Refills: 3 | Status: SHIPPED | OUTPATIENT
Start: 2022-03-17 | End: 2023-03-17

## 2022-03-18 LAB — T PALLIDUM AB SER QL: NEGATIVE

## 2022-03-21 LAB
C TRACH DNA SPEC QL NAA+PROBE: NEGATIVE
N GONORRHOEA DNA SPEC QL NAA+PROBE: NEGATIVE

## 2022-03-22 LAB
GENITAL VAGINOSIS SCREEN: NEGATIVE
TRICHOMONAS SCREEN: NEGATIVE

## 2022-06-15 ENCOUNTER — OFFICE VISIT (OUTPATIENT)
Dept: OBGYN CLINIC | Facility: CLINIC | Age: 27
End: 2022-06-15
Payer: COMMERCIAL

## 2022-06-15 VITALS
DIASTOLIC BLOOD PRESSURE: 74 MMHG | WEIGHT: 168.81 LBS | BODY MASS INDEX: 29.91 KG/M2 | SYSTOLIC BLOOD PRESSURE: 124 MMHG | HEIGHT: 63 IN

## 2022-06-15 DIAGNOSIS — N91.0 DELAYED MENSES: ICD-10-CM

## 2022-06-15 DIAGNOSIS — N92.6 MISSED MENSES: ICD-10-CM

## 2022-06-15 DIAGNOSIS — Z01.419 ENCOUNTER FOR ANNUAL ROUTINE GYNECOLOGICAL EXAMINATION: Primary | ICD-10-CM

## 2022-06-15 DIAGNOSIS — N89.8 VAGINAL DISCHARGE: ICD-10-CM

## 2022-06-15 DIAGNOSIS — Z11.3 SCREEN FOR STD (SEXUALLY TRANSMITTED DISEASE): ICD-10-CM

## 2022-06-15 LAB
CONTROL LINE PRESENT WITH A CLEAR BACKGROUND (YES/NO): YES YES/NO
PREGNANCY TEST, URINE: NEGATIVE

## 2022-06-15 PROCEDURE — 87591 N.GONORRHOEAE DNA AMP PROB: CPT | Performed by: OBSTETRICS & GYNECOLOGY

## 2022-06-15 PROCEDURE — 81025 URINE PREGNANCY TEST: CPT | Performed by: OBSTETRICS & GYNECOLOGY

## 2022-06-15 PROCEDURE — 3008F BODY MASS INDEX DOCD: CPT | Performed by: OBSTETRICS & GYNECOLOGY

## 2022-06-15 PROCEDURE — 87106 FUNGI IDENTIFICATION YEAST: CPT | Performed by: OBSTETRICS & GYNECOLOGY

## 2022-06-15 PROCEDURE — 87205 SMEAR GRAM STAIN: CPT | Performed by: OBSTETRICS & GYNECOLOGY

## 2022-06-15 PROCEDURE — 3074F SYST BP LT 130 MM HG: CPT | Performed by: OBSTETRICS & GYNECOLOGY

## 2022-06-15 PROCEDURE — 87491 CHLMYD TRACH DNA AMP PROBE: CPT | Performed by: OBSTETRICS & GYNECOLOGY

## 2022-06-15 PROCEDURE — 99395 PREV VISIT EST AGE 18-39: CPT | Performed by: OBSTETRICS & GYNECOLOGY

## 2022-06-15 PROCEDURE — 3078F DIAST BP <80 MM HG: CPT | Performed by: OBSTETRICS & GYNECOLOGY

## 2022-06-15 PROCEDURE — 87808 TRICHOMONAS ASSAY W/OPTIC: CPT | Performed by: OBSTETRICS & GYNECOLOGY

## 2022-06-16 LAB
C TRACH DNA SPEC QL NAA+PROBE: NEGATIVE
N GONORRHOEA DNA SPEC QL NAA+PROBE: NEGATIVE

## 2022-06-17 ENCOUNTER — TELEPHONE (OUTPATIENT)
Dept: OBGYN CLINIC | Facility: CLINIC | Age: 27
End: 2022-06-17

## 2022-06-17 DIAGNOSIS — B96.89 BACTERIAL VAGINOSIS: ICD-10-CM

## 2022-06-17 DIAGNOSIS — N76.0 BACTERIAL VAGINOSIS: ICD-10-CM

## 2022-06-17 LAB
GENITAL VAGINOSIS SCREEN: POSITIVE
TRICHOMONAS SCREEN: NEGATIVE

## 2022-06-17 RX ORDER — METRONIDAZOLE 500 MG/1
500 TABLET ORAL 2 TIMES DAILY
Qty: 14 TABLET | Refills: 0 | Status: SHIPPED | OUTPATIENT
Start: 2022-06-17

## 2022-06-17 NOTE — PROGRESS NOTES
RN spoke with pt, verified name and . RN provided pt with +yeast results and told pt that diflucan Rx was sent to pharmacy. Pt wanted Rx sent to a different pharmacy, so the Rx was resent. RN provided pt with admin instructions. Pt verbalized understanding and agreed with POC.

## 2022-12-01 ENCOUNTER — LAB ENCOUNTER (OUTPATIENT)
Dept: LAB | Facility: HOSPITAL | Age: 27
End: 2022-12-01
Attending: OBSTETRICS & GYNECOLOGY
Payer: COMMERCIAL

## 2022-12-01 ENCOUNTER — OFFICE VISIT (OUTPATIENT)
Dept: OBGYN CLINIC | Facility: CLINIC | Age: 27
End: 2022-12-01
Payer: COMMERCIAL

## 2022-12-01 ENCOUNTER — PATIENT MESSAGE (OUTPATIENT)
Dept: OBGYN CLINIC | Facility: CLINIC | Age: 27
End: 2022-12-01

## 2022-12-01 VITALS
WEIGHT: 173.63 LBS | SYSTOLIC BLOOD PRESSURE: 122 MMHG | BODY MASS INDEX: 30.77 KG/M2 | HEIGHT: 63 IN | DIASTOLIC BLOOD PRESSURE: 72 MMHG

## 2022-12-01 DIAGNOSIS — Z11.3 SCREEN FOR STD (SEXUALLY TRANSMITTED DISEASE): ICD-10-CM

## 2022-12-01 DIAGNOSIS — Z23 NEED FOR VACCINATION FOR H FLU TYPE B: ICD-10-CM

## 2022-12-01 DIAGNOSIS — N89.8 VAGINAL DISCHARGE: Primary | ICD-10-CM

## 2022-12-01 PROCEDURE — 87106 FUNGI IDENTIFICATION YEAST: CPT | Performed by: OBSTETRICS & GYNECOLOGY

## 2022-12-01 PROCEDURE — 90686 IIV4 VACC NO PRSV 0.5 ML IM: CPT | Performed by: OBSTETRICS & GYNECOLOGY

## 2022-12-01 PROCEDURE — 87389 HIV-1 AG W/HIV-1&-2 AB AG IA: CPT

## 2022-12-01 PROCEDURE — 99214 OFFICE O/P EST MOD 30 MIN: CPT | Performed by: OBSTETRICS & GYNECOLOGY

## 2022-12-01 PROCEDURE — 3008F BODY MASS INDEX DOCD: CPT | Performed by: OBSTETRICS & GYNECOLOGY

## 2022-12-01 PROCEDURE — 90471 IMMUNIZATION ADMIN: CPT | Performed by: OBSTETRICS & GYNECOLOGY

## 2022-12-01 PROCEDURE — 87205 SMEAR GRAM STAIN: CPT | Performed by: OBSTETRICS & GYNECOLOGY

## 2022-12-01 PROCEDURE — 86780 TREPONEMA PALLIDUM: CPT

## 2022-12-01 PROCEDURE — 36415 COLL VENOUS BLD VENIPUNCTURE: CPT

## 2022-12-01 PROCEDURE — 3074F SYST BP LT 130 MM HG: CPT | Performed by: OBSTETRICS & GYNECOLOGY

## 2022-12-01 PROCEDURE — 87808 TRICHOMONAS ASSAY W/OPTIC: CPT | Performed by: OBSTETRICS & GYNECOLOGY

## 2022-12-01 PROCEDURE — 3078F DIAST BP <80 MM HG: CPT | Performed by: OBSTETRICS & GYNECOLOGY

## 2022-12-01 RX ORDER — FLUCONAZOLE 150 MG/1
150 TABLET ORAL ONCE
Qty: 1 TABLET | Refills: 0 | Status: SHIPPED | OUTPATIENT
Start: 2022-12-01 | End: 2022-12-01

## 2022-12-02 LAB — T PALLIDUM AB SER QL: NEGATIVE

## 2022-12-02 RX ORDER — FLUCONAZOLE 150 MG/1
TABLET ORAL
Qty: 1 TABLET | Refills: 0 | Status: SHIPPED | OUTPATIENT
Start: 2022-12-02

## 2022-12-03 LAB
GENITAL VAGINOSIS SCREEN: NEGATIVE
TRICHOMONAS SCREEN: NEGATIVE

## 2022-12-06 ENCOUNTER — HOSPITAL ENCOUNTER (OUTPATIENT)
Age: 27
Discharge: HOME | End: 2022-12-06
Payer: COMMERCIAL

## 2022-12-06 VITALS
HEART RATE: 105 BPM | SYSTOLIC BLOOD PRESSURE: 142 MMHG | TEMPERATURE: 100 F | RESPIRATION RATE: 20 BRPM | DIASTOLIC BLOOD PRESSURE: 82 MMHG | OXYGEN SATURATION: 100 %

## 2022-12-06 DIAGNOSIS — J06.9 VIRAL URI WITH COUGH: Primary | ICD-10-CM

## 2022-12-06 LAB
B-HCG UR QL: NEGATIVE
POCT INFLUENZA A: NEGATIVE
POCT INFLUENZA B: NEGATIVE
S PYO AG THROAT QL: NEGATIVE

## 2022-12-06 PROCEDURE — 81025 URINE PREGNANCY TEST: CPT | Performed by: NURSE PRACTITIONER

## 2022-12-06 PROCEDURE — 99213 OFFICE O/P EST LOW 20 MIN: CPT | Performed by: NURSE PRACTITIONER

## 2022-12-06 PROCEDURE — 87880 STREP A ASSAY W/OPTIC: CPT | Performed by: NURSE PRACTITIONER

## 2022-12-06 PROCEDURE — 87502 INFLUENZA DNA AMP PROBE: CPT | Performed by: NURSE PRACTITIONER

## 2022-12-06 RX ORDER — IBUPROFEN 600 MG/1
600 TABLET ORAL ONCE
Status: COMPLETED | OUTPATIENT
Start: 2022-12-06 | End: 2022-12-06

## 2022-12-06 NOTE — ED INITIAL ASSESSMENT (HPI)
Pt c/o sore throat, runny nose, congestion and cough today. No fever. States had unprotected sex 5 days ago. Pt requesting STD testing. Denies any urinary symptoms. No vaginal discharge. States had covid 11/23/22.

## 2022-12-07 LAB
C TRACH DNA SPEC QL NAA+PROBE: NEGATIVE
N GONORRHOEA DNA SPEC QL NAA+PROBE: NEGATIVE

## 2022-12-07 NOTE — DISCHARGE INSTRUCTIONS
Please push fluids. Salt water gargles and tea with honey may help soothe throat. Tylenol or Motrin for pain or fever. Close follow-up with your primary care provider as recommended.   Any worsening symptoms please go to the emergency department

## 2022-12-12 ENCOUNTER — OFFICE VISIT (OUTPATIENT)
Dept: FAMILY MEDICINE CLINIC | Facility: CLINIC | Age: 27
End: 2022-12-12
Payer: COMMERCIAL

## 2022-12-12 VITALS
HEIGHT: 63 IN | SYSTOLIC BLOOD PRESSURE: 110 MMHG | BODY MASS INDEX: 31.36 KG/M2 | OXYGEN SATURATION: 92 % | WEIGHT: 177 LBS | DIASTOLIC BLOOD PRESSURE: 74 MMHG | HEART RATE: 105 BPM

## 2022-12-12 DIAGNOSIS — Z86.16 HISTORY OF COVID-19: ICD-10-CM

## 2022-12-12 DIAGNOSIS — J01.90 ACUTE NON-RECURRENT SINUSITIS, UNSPECIFIED LOCATION: ICD-10-CM

## 2022-12-12 DIAGNOSIS — R06.09 DYSPNEA ON EXERTION: ICD-10-CM

## 2022-12-12 DIAGNOSIS — J06.9 VIRAL URI: Primary | ICD-10-CM

## 2022-12-12 PROCEDURE — 3078F DIAST BP <80 MM HG: CPT | Performed by: NURSE PRACTITIONER

## 2022-12-12 PROCEDURE — 3008F BODY MASS INDEX DOCD: CPT | Performed by: NURSE PRACTITIONER

## 2022-12-12 PROCEDURE — 99213 OFFICE O/P EST LOW 20 MIN: CPT | Performed by: NURSE PRACTITIONER

## 2022-12-12 PROCEDURE — 3074F SYST BP LT 130 MM HG: CPT | Performed by: NURSE PRACTITIONER

## 2022-12-12 RX ORDER — AMOXICILLIN AND CLAVULANATE POTASSIUM 875; 125 MG/1; MG/1
1 TABLET, FILM COATED ORAL 2 TIMES DAILY
Qty: 20 TABLET | Refills: 0 | Status: SHIPPED | OUTPATIENT
Start: 2022-12-12 | End: 2022-12-22

## 2022-12-12 NOTE — PATIENT INSTRUCTIONS
Flonase nasal spray: 1 spray in each nostril twice daily. Think \"nose to toes\" and rinse mouth after use. Claritin or Zyrtec once daily (generic is OK)  Mucinex as needed over-the-counter for cough  Drink plenty of fluids and get rest. Humidifier in the bedroom. Sudafed as needed for sinus pressure/congestion. If no improvement with above in 2-4 days can start antibiotics.

## 2022-12-13 ENCOUNTER — HOSPITAL ENCOUNTER (OUTPATIENT)
Dept: GENERAL RADIOLOGY | Age: 27
Discharge: HOME | End: 2022-12-13
Attending: NURSE PRACTITIONER
Payer: COMMERCIAL

## 2022-12-13 DIAGNOSIS — J06.9 VIRAL URI: ICD-10-CM

## 2022-12-13 DIAGNOSIS — R06.09 DYSPNEA ON EXERTION: ICD-10-CM

## 2022-12-13 LAB
BILIRUB UR QL STRIP: NEGATIVE
COLOR UR: YELLOW
GLUCOSE UR STRIP-MCNC: NEGATIVE MG/DL
HGB UR QL STRIP: NEGATIVE
KETONES UR STRIP-MCNC: NEGATIVE MG/DL
LEUKOCYTE ESTERASE UR QL STRIP: NEGATIVE
NITRITE UR QL STRIP: NEGATIVE
PH UR STRIP: 7 [PH]
PROT UR STRIP-MCNC: NEGATIVE MG/DL
SP GR UR STRIP: 1.02
UROBILINOGEN UR STRIP-ACNC: <2 MG/DL

## 2023-03-19 ENCOUNTER — PATIENT MESSAGE (OUTPATIENT)
Dept: OBGYN CLINIC | Facility: CLINIC | Age: 28
End: 2023-03-19

## 2023-03-20 NOTE — TELEPHONE ENCOUNTER
From: Chapito Reyes  To: Rafael Serrano MD  Sent: 3/19/2023 7:32 PM CDT  Subject: Prescription     Hi Dr Iveth Cameron,     I believe my BV is back would it be possible to have my prescription refilled? That is why I scheduled my appointment this Wednesday for. Thanks.

## 2023-04-05 ENCOUNTER — LAB ENCOUNTER (OUTPATIENT)
Dept: LAB | Facility: HOSPITAL | Age: 28
End: 2023-04-05
Attending: OBSTETRICS & GYNECOLOGY
Payer: COMMERCIAL

## 2023-04-05 ENCOUNTER — OFFICE VISIT (OUTPATIENT)
Dept: OBGYN CLINIC | Facility: CLINIC | Age: 28
End: 2023-04-05
Payer: COMMERCIAL

## 2023-04-05 VITALS
HEIGHT: 63 IN | SYSTOLIC BLOOD PRESSURE: 112 MMHG | DIASTOLIC BLOOD PRESSURE: 72 MMHG | WEIGHT: 170.19 LBS | BODY MASS INDEX: 30.16 KG/M2

## 2023-04-05 DIAGNOSIS — Z11.3 SCREEN FOR STD (SEXUALLY TRANSMITTED DISEASE): ICD-10-CM

## 2023-04-05 DIAGNOSIS — N89.8 VAGINAL DISCHARGE: Primary | ICD-10-CM

## 2023-04-05 PROCEDURE — 87808 TRICHOMONAS ASSAY W/OPTIC: CPT | Performed by: OBSTETRICS & GYNECOLOGY

## 2023-04-05 PROCEDURE — 87205 SMEAR GRAM STAIN: CPT | Performed by: OBSTETRICS & GYNECOLOGY

## 2023-04-05 PROCEDURE — 86780 TREPONEMA PALLIDUM: CPT

## 2023-04-05 PROCEDURE — 87389 HIV-1 AG W/HIV-1&-2 AB AG IA: CPT

## 2023-04-05 PROCEDURE — 3078F DIAST BP <80 MM HG: CPT | Performed by: OBSTETRICS & GYNECOLOGY

## 2023-04-05 PROCEDURE — 3074F SYST BP LT 130 MM HG: CPT | Performed by: OBSTETRICS & GYNECOLOGY

## 2023-04-05 PROCEDURE — 36415 COLL VENOUS BLD VENIPUNCTURE: CPT

## 2023-04-05 PROCEDURE — 3008F BODY MASS INDEX DOCD: CPT | Performed by: OBSTETRICS & GYNECOLOGY

## 2023-04-05 PROCEDURE — 87106 FUNGI IDENTIFICATION YEAST: CPT | Performed by: OBSTETRICS & GYNECOLOGY

## 2023-04-05 PROCEDURE — 87591 N.GONORRHOEAE DNA AMP PROB: CPT | Performed by: OBSTETRICS & GYNECOLOGY

## 2023-04-05 PROCEDURE — 99213 OFFICE O/P EST LOW 20 MIN: CPT | Performed by: OBSTETRICS & GYNECOLOGY

## 2023-04-05 PROCEDURE — 87491 CHLMYD TRACH DNA AMP PROBE: CPT | Performed by: OBSTETRICS & GYNECOLOGY

## 2023-04-05 RX ORDER — METRONIDAZOLE 500 MG/1
500 TABLET ORAL 2 TIMES DAILY
Qty: 14 TABLET | Refills: 0 | Status: SHIPPED | OUTPATIENT
Start: 2023-04-05 | End: 2023-04-12

## 2023-04-06 LAB
C TRACH DNA SPEC QL NAA+PROBE: NEGATIVE
N GONORRHOEA DNA SPEC QL NAA+PROBE: NEGATIVE

## 2023-04-07 LAB
GENITAL VAGINOSIS SCREEN: NEGATIVE
T PALLIDUM AB SER QL: NEGATIVE
TRICHOMONAS SCREEN: NEGATIVE

## (undated) NOTE — LETTER
Date: 12/12/2022    Patient Name: Kathy Weeks          To Whom it may concern: This letter has been written at the patient's request. The above patient was seen at the [facility] for treatment of a medical condition. This patient should be excused from attending work due to illness. She may return when feeling better and free from fever for >24 hours.       Sincerely,        MARIE Meier

## (undated) NOTE — LETTER
[facility]  [address] [facility] [address]  [phone]     Patient: Gloria Clay   YOB: 1995   Date of Visit: 5/7/2021     Dear Employer,        May 7, 2021    At [facility], we are taking special precautions and isolation and other precautions 10 days after the date of their first positive RT-PCR test for SARS-CoV-2 RNA.     Persons who are asymptomatic but have been exposed, CDC recommends 14 days of quarantine after exposure based on the time it takes to develop

## (undated) NOTE — MR AVS SNAPSHOT
After Visit Summary   11/13/2020    Mary Benites    MRN: [id number]           Visit Information     Date & Time  11/13/2020 10:40 AM Provider  Fly Cid MD [address] [place] [address], [address] [place] [address],[address], [facility].  Phone  33 If you receive a survey from [employer], please take a few minutes to complete it and provide feedback. We strive to deliver the best patient experience and are looking for ways to make improvements. Your feedback will help us do so.  For more information EMERGENCY ROOM Life-threatening emergencies needing immediate intervention at a hospital emergency room.  Average cost  $2,300*   *Cost varies based on your insurance coverage  For more information about hours, locations or appointment options available at

## (undated) NOTE — LETTER
Date & Time: 12/6/2022, 6:10 PM  Patient: Valerie España  Encounter Provider(s):    MARIE Duenas       To Whom It May Concern:    Valreie España was seen and treated in our department on 12/6/2022. She can return to work 12/8/22.     If you have any questions or concerns, please do not hesitate to call.        _____________________________  Physician/APC Signature

## (undated) NOTE — LETTER
22    RE: Shyann Elias    : 3/3/1995    To Whom It May Concern:    Our patient, Shyann Elias,      _xx____Has received treatment in our office on __2022 ____        _____Has been hospitalized on the following dates ______________________.       _____ Has been ill and unable to work from _____________________________.        __x___ May resume work / school on 2022      _____ May resume work / school with the following restrictions ______________    __________________________________________________________.      _____ May participate in physical education. _____ May participate in a prenatal exercise class / yoga class. _____ Patient is pregnant with a due date of Estimated Date of Delivery: None noted.       __xx___ Other _________Patient received Flu Vaccine on 2022____________       Sincerely yours,       Eleanor Awad MD

## (undated) NOTE — LETTER
Date & Time: 10/28/2020, 7:52 PM  Patient: Emily Ervin  Encounter Provider(s):    MARIE Sutherland       To Whom It May Concern:    Emily Ervin was seen and treated in our department on 10/28/2020.  She should not return to work until Seen by [facility]